# Patient Record
Sex: FEMALE | Race: AMERICAN INDIAN OR ALASKA NATIVE
[De-identification: names, ages, dates, MRNs, and addresses within clinical notes are randomized per-mention and may not be internally consistent; named-entity substitution may affect disease eponyms.]

---

## 2021-03-14 ENCOUNTER — HOSPITAL ENCOUNTER (EMERGENCY)
Dept: HOSPITAL 7 - FB.ED | Age: 76
Discharge: HOME | End: 2021-03-14
Payer: MEDICARE

## 2021-03-14 VITALS — SYSTOLIC BLOOD PRESSURE: 146 MMHG | HEART RATE: 55 BPM | DIASTOLIC BLOOD PRESSURE: 60 MMHG

## 2021-03-14 DIAGNOSIS — Z88.1: ICD-10-CM

## 2021-03-14 DIAGNOSIS — I10: ICD-10-CM

## 2021-03-14 DIAGNOSIS — E87.6: Primary | ICD-10-CM

## 2021-03-14 DIAGNOSIS — Z88.8: ICD-10-CM

## 2021-03-14 DIAGNOSIS — I48.91: ICD-10-CM

## 2021-03-14 DIAGNOSIS — Z86.73: ICD-10-CM

## 2021-03-14 DIAGNOSIS — Z79.899: ICD-10-CM

## 2021-03-14 DIAGNOSIS — E11.9: ICD-10-CM

## 2021-03-14 DIAGNOSIS — Z91.018: ICD-10-CM

## 2021-03-14 DIAGNOSIS — J45.909: ICD-10-CM

## 2021-03-14 DIAGNOSIS — Z91.010: ICD-10-CM

## 2021-03-14 DIAGNOSIS — Z88.0: ICD-10-CM

## 2021-03-14 NOTE — EDM.PDOC
ED HPI GENERAL MEDICAL PROBLEM





- General


Chief Complaint: Cardiovascular Problem


Stated Complaint: low potassium


Time Seen by Provider: 03/14/21 12:45


Source of Information: Reports: Patient


History Limitations: Reports: No Limitations





- History of Present Illness


INITIAL COMMENTS - FREE TEXT/NARRATIVE: 





pt states on friday she was not feeling well, developed palpitations


Went To ER  was seen and noted to have Afib: states it was difficult for then to

get her rate controlled like usual ( was given 2 doses of 2.5mg metoprolol)


Was also noted to have low K , had IV K given 


States she had felt better , but this am when she woke up she felt sob and had 

palpitations also


that was about 45 mins prior to arrival in ER: her HR is in the 50's and she 

still is complaining of shortness of breath


no chest pain , no HA no fever or chills no abd pain 





states she has a history of asthma and is on advair daily and albuterol prn


states sob is not similar to same when she has acute asthma exacerbation











Onset: Today


Onset Date: 03/14/21


Duration: Resolved Prior to Arrival


Location: Reports: Chest


Quality: Reports: Same as Previous Episode


Improves with: Reports: None


Worsens with: Reports: None


Associated Symptoms: Reports: No Other Symptoms


Treatments PTA: Reports: Other Medication(s)





- Related Data


                                    Allergies











Allergy/AdvReac Type Severity Reaction Status Date / Time


 


amoxicillin Allergy  Edema Verified 03/14/21 13:01


 


grapefruit Allergy  Hives Verified 03/14/21 13:01


 


naproxen Allergy  Nausea Verified 03/14/21 13:01


 


Penicillins Allergy  Hives Verified 03/14/21 13:01


 


dye Allergy  Shortness Uncoded 01/05/16 15:43





   of Breath  


 


PEANUT BUTTER Allergy  Itching Uncoded 02/04/16 15:28











Home Meds: 


                                    Home Meds





Albuterol [Ventolin HFA] 2 puff INH Q4H PRN 01/05/16 [History]


Pantoprazole 40 mg PO ACBREAKFAST 01/05/16 [History]


Ergocalciferol (Vitamin D2) [Vitamin D2] 50,000 unit PO ASDIRECTED 02/04/16 

[History]


Fluticasone/Salmeterol [Advair Diskus 500-50] 1 puff INH BID 02/04/16 [History]


Triamterene/Hydrochlorothiazid [Triamterene-HCTZ 37.5-25 MG] 1 each PO ASDI

RECTED 02/04/16 [History]


Sertraline [Zoloft] 25 mg PO DAILY 03/14/21 [History]











Past Medical History


Cardiovascular History: Reports: Hypertension


Respiratory History: Reports: Asthma


Gastrointestinal History: Reports: Other (See Below)


Other Gastrointestinal History: HX OF DIVERTICULITIS


Neurological History: Reports: TIA, Other (See Below)


Other Neuro History: HX OF TIA 2004


Endocrine/Metabolic History: Reports: Diabetes, Type II





- Past Surgical History


HEENT Surgical History: Reports: Cataract Surgery, Other (See Below)





Social & Family History





- Family History


Oncologic: Reports: Leukemia





ED ROS GENERAL





- Review of Systems


Review Of Systems: See Below


Constitutional: Denies: Fever, Chills, Malaise, Weakness, Fatigue


HEENT: Reports: No Symptoms


Respiratory: Denies: Shortness of Breath, Cough


Cardiovascular: Reports: Lightheadedness.  Denies: Chest Pain, Dyspnea on 

Exertion, Edema, Palpitations


Endocrine: Denies: Fatigue


GI/Abdominal: Reports: No Symptoms


: Reports: No Symptoms


Musculoskeletal: Reports: No Symptoms


Skin: Reports: No Symptoms


Neurological: Reports: Dizziness.  Denies: Confusion, Headache, Paresthesia, 

Tremors, Trouble Speaking


Psychiatric: Reports: No Symptoms


Hematologic/Lymphatic: Reports: No Symptoms


Immunologic: Reports: No Symptoms





ED EXAM, GENERAL





- Physical Exam


Exam: See Below


Exam Limited By: No Limitations


General Appearance: Alert, WD/WN, No Apparent Distress


Eye Exam: Right Eye: Proptosis


Ears: Normal TMs


Ear Exam: Bilateral Ear: Canal Normal


Nose: Normal Inspection


Throat/Mouth: Normal Oropharynx


Head: Atraumatic, Normocephalic


Neck: Supple, Non-Tender


Respiratory/Chest: Lungs Clear, Normal Breath Sounds


Cardiovascular: Regular Rate, Rhythm


GI/Abdominal: Soft, Non-Tender


Back Exam: Normal Inspection, Full Range of Motion


Extremities: Normal Inspection, Normal Range of Motion


Neurological: Alert, Oriented, CN II-XII Intact


Psychiatric: Normal Affect


Skin Exam: Warm, Dry


  ** #1 Interpretation


EKG Date: 03/14/21


Time: 12:29


Rhythm: NSR


Rate (Beats/Min): 55


Axis: LAD-Left Axis Deviation


P-Wave: Present


QRS: Normal


ST-T: Normal


QT: Normal


Comparison: No Change (compared to 01/05/2016)


EKG Interpretation Comments: 





normal EKG





Course





- Vital Signs


Last Recorded V/S: 


                                Last Vital Signs











Temp  36.8 C   03/14/21 12:35


 


Pulse  55 L  03/14/21 12:35


 


Resp  18   03/14/21 12:35


 


BP  146/60 H  03/14/21 12:35


 


Pulse Ox  100   03/14/21 12:35














- Orders/Labs/Meds


Orders: 


                               Active Orders 24 hr











 Category Date Time Status


 


 Chest 2V [CR] Stat Exams  03/14/21 13:34 Taken


 


 EKG 12 Lead [EK] Routine Ther  03/14/21 12:32 Ordered











Labs: 


                                Laboratory Tests











  03/14/21 03/14/21 03/14/21 Range/Units





  12:50 12:50 12:50 


 


WBC  9.3    (3.0-10.3)  x10-3/uL


 


RBC  5.28 H    (3.60-5.20)  x10(6)uL


 


Hgb  13.6    (11.4-15.5)  g/dL


 


Hct  42.6    (34.2-48.2)  %


 


MCV  80.6    (76.7-100.5)  fL


 


MCH  25.8    (23.9-33.9)  pg


 


MCHC  32.1    (31.9-34.8)  g/dL


 


RDW  15.1    (12.3-16.5)  %


 


Plt Count  272    (151-488)  x10(3)uL


 


MPV  9.0    (7.1-12.4)  fL


 


Neut % (Auto)  66.5    (30.8-76.2)  %


 


Lymph % (Auto)  23.2    (18.4-52.1)  %


 


Mono % (Auto)  6.3    (4.4-15.7)  %


 


Eos % (Auto)  2.9    (0.6-8.1)  %


 


Baso % (Auto)  1.1    (0.2-1.5)  %


 


Neut # (Auto)  6.2    (1.5-6.3)  x10-3/uL


 


Lymph # (Auto)  2.1    (1.0-4.4)  x10-3/uL


 


Mono # (Auto)  0.6    (0.3-1.0)  x10-3/uL


 


Eos # (Auto)  0.3    (0.0-0.8)  x10-3/uL


 


Baso # (Auto)  0.1    (0.0-0.1)  x10-3/uL


 


PT   10.7   (9.0-11.1)  sec


 


INR   0.99 L   (1.00-1.24)  


 


D-Dimer, Quantitative     (0.0-0.59)  mg/LFEU


 


Sodium    141  (135-145)  mmol/L


 


Potassium    3.9  (3.5-5.3)  mmol/L


 


Chloride    101  (100-110)  mmol/L


 


Carbon Dioxide    29  (21-32)  mmol/L


 


BUN    12  (7-18)  mg/dL


 


Creatinine    1.1 H  (0.55-1.02)  mg/dL


 


Est Cr Clr Drug Dosing    TNP  


 


Estimated GFR (MDRD)    49 L  (>60)  


 


BUN/Creatinine Ratio    10.9  (9-20)  


 


Glucose    106  ()  mg/dL


 


Calcium    8.5 L  (8.6-10.2)  mg/dL


 


Total Bilirubin    0.6  (0.1-1.3)  mg/dL


 


AST    12  (5-25)  IU/L


 


ALT    21  (12-36)  U/L


 


Alkaline Phosphatase    64  ()  IU/L


 


Troponin I     (4.0-60.3)  pg/mL


 


NT-Pro-B Natriuret Pep     (<=125)  pg/mL


 


Total Protein    7.6  (6.0-8.0)  g/dL


 


Albumin    3.5  (3.2-4.6)  g/dL


 


Globulin    4.1  g/dL


 


Albumin/Globulin Ratio    0.9  


 


Urine Color     (YELLOW)  


 


Urine Appearance     (CLEAR)  


 


Urine pH     (5.0-6.5)  


 


Ur Specific Gravity     (1.010-1.025)  


 


Urine Protein     (NEGATIVE)  mg/dL


 


Urine Glucose (UA)     (NORMAL)  mg/dL


 


Urine Ketones     (NEGATIVE)  mg/dL


 


Urine Occult Blood     (NEGATIVE)  


 


Urine Nitrite     (NEGATIVE)  


 


Urine Bilirubin     (NEGATIVE)  


 


Urine Urobilinogen     (NEGATIVE)  mg/dL


 


Ur Leukocyte Esterase     (NEGATIVE)  


 


Urine RBC     (0-5)  


 


Urine WBC     (0-5)  


 


Ur Squamous Epith Cells     (NS,R,O)  


 


Urine Bacteria     (NS)  














  03/14/21 03/14/21 03/14/21 Range/Units





  12:50 12:50 12:50 


 


WBC     (3.0-10.3)  x10-3/uL


 


RBC     (3.60-5.20)  x10(6)uL


 


Hgb     (11.4-15.5)  g/dL


 


Hct     (34.2-48.2)  %


 


MCV     (76.7-100.5)  fL


 


MCH     (23.9-33.9)  pg


 


MCHC     (31.9-34.8)  g/dL


 


RDW     (12.3-16.5)  %


 


Plt Count     (151-488)  x10(3)uL


 


MPV     (7.1-12.4)  fL


 


Neut % (Auto)     (30.8-76.2)  %


 


Lymph % (Auto)     (18.4-52.1)  %


 


Mono % (Auto)     (4.4-15.7)  %


 


Eos % (Auto)     (0.6-8.1)  %


 


Baso % (Auto)     (0.2-1.5)  %


 


Neut # (Auto)     (1.5-6.3)  x10-3/uL


 


Lymph # (Auto)     (1.0-4.4)  x10-3/uL


 


Mono # (Auto)     (0.3-1.0)  x10-3/uL


 


Eos # (Auto)     (0.0-0.8)  x10-3/uL


 


Baso # (Auto)     (0.0-0.1)  x10-3/uL


 


PT     (9.0-11.1)  sec


 


INR     (1.00-1.24)  


 


D-Dimer, Quantitative   0.22   (0.0-0.59)  mg/LFEU


 


Sodium     (135-145)  mmol/L


 


Potassium     (3.5-5.3)  mmol/L


 


Chloride     (100-110)  mmol/L


 


Carbon Dioxide     (21-32)  mmol/L


 


BUN     (7-18)  mg/dL


 


Creatinine     (0.55-1.02)  mg/dL


 


Est Cr Clr Drug Dosing     


 


Estimated GFR (MDRD)     (>60)  


 


BUN/Creatinine Ratio     (9-20)  


 


Glucose     ()  mg/dL


 


Calcium     (8.6-10.2)  mg/dL


 


Total Bilirubin     (0.1-1.3)  mg/dL


 


AST     (5-25)  IU/L


 


ALT     (12-36)  U/L


 


Alkaline Phosphatase     ()  IU/L


 


Troponin I    4.8  (4.0-60.3)  pg/mL


 


NT-Pro-B Natriuret Pep  243 H    (<=125)  pg/mL


 


Total Protein     (6.0-8.0)  g/dL


 


Albumin     (3.2-4.6)  g/dL


 


Globulin     g/dL


 


Albumin/Globulin Ratio     


 


Urine Color     (YELLOW)  


 


Urine Appearance     (CLEAR)  


 


Urine pH     (5.0-6.5)  


 


Ur Specific Gravity     (1.010-1.025)  


 


Urine Protein     (NEGATIVE)  mg/dL


 


Urine Glucose (UA)     (NORMAL)  mg/dL


 


Urine Ketones     (NEGATIVE)  mg/dL


 


Urine Occult Blood     (NEGATIVE)  


 


Urine Nitrite     (NEGATIVE)  


 


Urine Bilirubin     (NEGATIVE)  


 


Urine Urobilinogen     (NEGATIVE)  mg/dL


 


Ur Leukocyte Esterase     (NEGATIVE)  


 


Urine RBC     (0-5)  


 


Urine WBC     (0-5)  


 


Ur Squamous Epith Cells     (NS,R,O)  


 


Urine Bacteria     (NS)  














  03/14/21 Range/Units





  13:50 


 


WBC   (3.0-10.3)  x10-3/uL


 


RBC   (3.60-5.20)  x10(6)uL


 


Hgb   (11.4-15.5)  g/dL


 


Hct   (34.2-48.2)  %


 


MCV   (76.7-100.5)  fL


 


MCH   (23.9-33.9)  pg


 


MCHC   (31.9-34.8)  g/dL


 


RDW   (12.3-16.5)  %


 


Plt Count   (151-488)  x10(3)uL


 


MPV   (7.1-12.4)  fL


 


Neut % (Auto)   (30.8-76.2)  %


 


Lymph % (Auto)   (18.4-52.1)  %


 


Mono % (Auto)   (4.4-15.7)  %


 


Eos % (Auto)   (0.6-8.1)  %


 


Baso % (Auto)   (0.2-1.5)  %


 


Neut # (Auto)   (1.5-6.3)  x10-3/uL


 


Lymph # (Auto)   (1.0-4.4)  x10-3/uL


 


Mono # (Auto)   (0.3-1.0)  x10-3/uL


 


Eos # (Auto)   (0.0-0.8)  x10-3/uL


 


Baso # (Auto)   (0.0-0.1)  x10-3/uL


 


PT   (9.0-11.1)  sec


 


INR   (1.00-1.24)  


 


D-Dimer, Quantitative   (0.0-0.59)  mg/LFEU


 


Sodium   (135-145)  mmol/L


 


Potassium   (3.5-5.3)  mmol/L


 


Chloride   (100-110)  mmol/L


 


Carbon Dioxide   (21-32)  mmol/L


 


BUN   (7-18)  mg/dL


 


Creatinine   (0.55-1.02)  mg/dL


 


Est Cr Clr Drug Dosing   


 


Estimated GFR (MDRD)   (>60)  


 


BUN/Creatinine Ratio   (9-20)  


 


Glucose   ()  mg/dL


 


Calcium   (8.6-10.2)  mg/dL


 


Total Bilirubin   (0.1-1.3)  mg/dL


 


AST   (5-25)  IU/L


 


ALT   (12-36)  U/L


 


Alkaline Phosphatase   ()  IU/L


 


Troponin I   (4.0-60.3)  pg/mL


 


NT-Pro-B Natriuret Pep   (<=125)  pg/mL


 


Total Protein   (6.0-8.0)  g/dL


 


Albumin   (3.2-4.6)  g/dL


 


Globulin   g/dL


 


Albumin/Globulin Ratio   


 


Urine Color  Yellow  (YELLOW)  


 


Urine Appearance  Clear  (CLEAR)  


 


Urine pH  8.0 H  (5.0-6.5)  


 


Ur Specific Gravity  1.010  (1.010-1.025)  


 


Urine Protein  Negative  (NEGATIVE)  mg/dL


 


Urine Glucose (UA)  Normal  (NORMAL)  mg/dL


 


Urine Ketones  Negative  (NEGATIVE)  mg/dL


 


Urine Occult Blood  Negative  (NEGATIVE)  


 


Urine Nitrite  Negative  (NEGATIVE)  


 


Urine Bilirubin  Negative  (NEGATIVE)  


 


Urine Urobilinogen  Normal  (NEGATIVE)  mg/dL


 


Ur Leukocyte Esterase  Negative  (NEGATIVE)  


 


Urine RBC  0-5  (0-5)  


 


Urine WBC  0-5  (0-5)  


 


Ur Squamous Epith Cells  Rare  (NS,R,O)  


 


Urine Bacteria  Rare H  (NS)  














- Re-Assessments/Exams


Free Text/Narrative Re-Assessment/Exam: 





03/14/21 13:48


pt had labs , EKG done immediately 


EKG showed NSR of 55


Cxray done 


Review labs: normal K level


03/14/21 15:26








Departure





- Departure


Time of Disposition: 15:30


Disposition: Home, Self-Care 01


Condition: Fair


Clinical Impression: 


 Dyspnea, Afib, Palpitations, Hypokalemia due to loss of potassium





Instructions:  Shortness of Breath, Adult, Easy-to-Read, Atrial Fibrillation, 

Easy-to-Read


Referrals: 


Zainab Torres PA [Primary Care Provider] - 


Forms:  ED Department Discharge


Additional Instructions: 


1) Eat a banana daily 


you need recheck of potassium level this week 


2) Make appointment to see a PCP this week 


3) keep appointment with cardiologist





Sepsis Event Note (ED)





- Evaluation


Sepsis Screening Result: No Definite Risk





- My Orders


Last 24 Hours: 


My Active Orders





03/14/21 12:32


EKG 12 Lead [EK] Routine 





03/14/21 13:34


Chest 2V [CR] Stat 














- Assessment/Plan


Last 24 Hours: 


My Active Orders





03/14/21 12:32


EKG 12 Lead [EK] Routine 





03/14/21 13:34


Chest 2V [CR] Stat

## 2021-03-15 NOTE — CR
INDICATION:  Dyspnea. 



CHEST, TWO VIEWS:  PA and lateral views of the chest were obtained 03/14/21 and 
compared with 01/05/16, again revealing evidence of exogenous obesity.  



The heart did not appear enlarged but may be near the upper limits of normal in 
size. 



The aorta is tortuous.  



Overlying EKG leads are noted.  



An active infiltrate or effusion was not identified.  



Diaphragm leaves are flattened with prominent AP diameter and hyperaeration 
raising question of COPD - correlate clinically.  



IMPRESSION:  

1.  No definite acute process. 

2.  Probable COPD. 

3.  Probable ASHD.



MTDD